# Patient Record
Sex: FEMALE | Race: WHITE | Employment: OTHER | URBAN - METROPOLITAN AREA
[De-identification: names, ages, dates, MRNs, and addresses within clinical notes are randomized per-mention and may not be internally consistent; named-entity substitution may affect disease eponyms.]

---

## 2022-08-16 DIAGNOSIS — N39.0 URINARY TRACT INFECTION, SITE NOT SPECIFIED: ICD-10-CM

## 2022-08-19 RX ORDER — NITROFURANTOIN MACROCRYSTALS 50 MG/1
CAPSULE ORAL
Qty: 30 CAPSULE | Refills: 0 | Status: SHIPPED | OUTPATIENT
Start: 2022-08-19

## 2022-11-01 ENCOUNTER — OFFICE VISIT (OUTPATIENT)
Dept: ENT CLINIC | Age: 82
End: 2022-11-01
Payer: MEDICARE

## 2022-11-01 VITALS
BODY MASS INDEX: 20.38 KG/M2 | WEIGHT: 115 LBS | SYSTOLIC BLOOD PRESSURE: 140 MMHG | HEIGHT: 63 IN | DIASTOLIC BLOOD PRESSURE: 78 MMHG

## 2022-11-01 DIAGNOSIS — R13.14 PHARYNGOESOPHAGEAL DYSPHAGIA: ICD-10-CM

## 2022-11-01 DIAGNOSIS — C01 MALIGNANT NEOPLASM OF BASE OF TONGUE (HCC): Primary | ICD-10-CM

## 2022-11-01 PROCEDURE — G8420 CALC BMI NORM PARAMETERS: HCPCS | Performed by: STUDENT IN AN ORGANIZED HEALTH CARE EDUCATION/TRAINING PROGRAM

## 2022-11-01 PROCEDURE — G8484 FLU IMMUNIZE NO ADMIN: HCPCS | Performed by: STUDENT IN AN ORGANIZED HEALTH CARE EDUCATION/TRAINING PROGRAM

## 2022-11-01 PROCEDURE — 1123F ACP DISCUSS/DSCN MKR DOCD: CPT | Performed by: STUDENT IN AN ORGANIZED HEALTH CARE EDUCATION/TRAINING PROGRAM

## 2022-11-01 PROCEDURE — 1090F PRES/ABSN URINE INCON ASSESS: CPT | Performed by: STUDENT IN AN ORGANIZED HEALTH CARE EDUCATION/TRAINING PROGRAM

## 2022-11-01 PROCEDURE — 1036F TOBACCO NON-USER: CPT | Performed by: STUDENT IN AN ORGANIZED HEALTH CARE EDUCATION/TRAINING PROGRAM

## 2022-11-01 PROCEDURE — 99213 OFFICE O/P EST LOW 20 MIN: CPT | Performed by: STUDENT IN AN ORGANIZED HEALTH CARE EDUCATION/TRAINING PROGRAM

## 2022-11-01 PROCEDURE — G8427 DOCREV CUR MEDS BY ELIG CLIN: HCPCS | Performed by: STUDENT IN AN ORGANIZED HEALTH CARE EDUCATION/TRAINING PROGRAM

## 2022-11-01 PROCEDURE — G8400 PT W/DXA NO RESULTS DOC: HCPCS | Performed by: STUDENT IN AN ORGANIZED HEALTH CARE EDUCATION/TRAINING PROGRAM

## 2022-11-01 RX ORDER — ATORVASTATIN CALCIUM 20 MG/1
TABLET, FILM COATED ORAL
COMMUNITY
Start: 2022-09-22

## 2022-11-01 RX ORDER — SODIUM FLUORIDE 6 MG/ML
PASTE, DENTIFRICE DENTAL
COMMUNITY
Start: 2022-10-14

## 2022-11-01 ASSESSMENT — ENCOUNTER SYMPTOMS
RESPIRATORY NEGATIVE: 1
ALLERGIC/IMMUNOLOGIC NEGATIVE: 1
GASTROINTESTINAL NEGATIVE: 1
EYES NEGATIVE: 1

## 2022-11-01 NOTE — PROGRESS NOTES
[facility] Office Note    Patient: Silverio Parsons  MRN: [id number]  : 1940  Gender:  female  Vital Signs: BP (!) 140/78 (Site: Left Upper Arm, Position: Sitting)   Ht 5' 3\" (1.6 m)   Wt 115 lb (52.2 kg)   BMI 20.37 kg/m²   Date: 2022    CC:   Chief Complaint   Patient presents with    Follow-up     H/o DL TDL TB esophagoscopy DL with biopy right BOT/Tonsil 13 path SCC right BOT and tonsil completed XRT 14, followed by Oscar, routine ear cleaning        HPI:  Silverio Parsons is a [de-identified] y.o. 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 She completed radiation in . She still follows with radiation oncology at [facility] and was scoped by them in . She endorses dysphagia. She previously saw speech therapy in [facility] who she likes. She has not seen GI. She wears hearing aids. Past Medical History, Past Surgical History, Family history, Social History, and Medications were all reviewed with the patient today and updated as necessary.      Allergies   Allergen Reactions    Latex Other (See Comments)     Patient states she is not allegic    Moxifloxacin Other (See Comments)     Pt states not allergic     Patient Active Problem List   Diagnosis    Urinary tract infection, site not specified    Hypothyroidism    Pure hypercholesterolemia    Calculus of kidney    Glaucoma     Current Outpatient Medications   Medication Sig    atorvastatin (LIPITOR) 20 MG tablet TAKE 1/2 (HALF) TABLET BY MOUTH DAILY OR AS DIRECTED    PREVIDENT 5000 BOOSTER PLUS 1.1 % PSTE USE WHEN BRUSHING TEETH AS NEEDED    nitrofurantoin (MACRODANTIN) 50 MG capsule TAKE 1 CAPSULE BY MOUTH EVERY DAY    atorvastatin (LIPITOR) 10 MG tablet Take by mouth daily    vitamin D 25 MCG (1000 UT) CAPS Take by mouth 2 times daily    ciprofloxacin (CIPRO) 500 MG tablet Take 500 mg by mouth 2 times daily    levothyroxine (SYNTHROID) 88 MCG tablet Take 88 mcg by mouth every morning (before breakfast)    SODIUM FLUORIDE, DENTAL GEL, 1.1 % GEL USE WHEN BRUSHING TEETH AS NEEDED     No current facility-administered medications for this visit. Past Medical History:   Diagnosis Date    Arthritis     Calculus of kidney     Calculus of kidney     Chronic pharyngitis     Dry mouth     Epistaxis     GERD (gastroesophageal reflux disease)     omeprazole    Glaucoma     Hoarseness     Hypothyroidism     Mixed conductive and sensorineural hearing loss     Pure hypercholesterolemia     Squamous cell cancer of tongue (HCC)     Thyroid disease     hypothyroid    Tongue lesion     Urinary tract infection, site not specified      Social History     Tobacco Use    Smoking status: Never    Smokeless tobacco: Never   Substance Use Topics    Alcohol use: No     Past Surgical History:   Procedure Laterality Date    CHOLECYSTECTOMY  2/2012    COLONOSCOPY      NEUROLOGICAL SURGERY      glossopharyngeal nerve surgery    OTHER SURGICAL HISTORY  1970's    Laprascopic \"band aid\" procedure    OTHER SURGICAL HISTORY  2003    Removal implants    OTHER SURGICAL HISTORY  1980's    Facial plastic surgery    LA ABDOMEN SURGERY PROC UNLISTED      feeding tube placed - later removed    RECTOCELE REPAIR  1970's    TRABECULECTOMY Left 04/2020    TUBAL LIGATION       Family History   Problem Relation Age of Onset    Cancer Father         PROSTATE GLAND        ROS:    Review of Systems   HENT: Negative. Eyes: Negative. Respiratory: Negative. Cardiovascular: Negative. Gastrointestinal: Negative. Endocrine: Negative. Genitourinary: Negative. Musculoskeletal: Negative. Skin: Negative. Allergic/Immunologic: Negative. Neurological: Negative. Hematological: Negative. Psychiatric/Behavioral: Negative. PHYSICAL EXAM:    BP (!) 140/78 (Site: Left Upper Arm, Position: Sitting)   Ht 5' 3\" (1.6 m)   Wt 115 lb (52.2 kg)   BMI 20.37 kg/m²     General: NAD, well-appearing  Neuro: No gross neuro deficits. CN's II-XII intact. No facial weakness. Eyes: EOMI. Pupils reactive. No periorbital edema/ecchymosis. Skin: No facial erythema, rashes or concerning lesions. Nose: No external deviations or saddling. Intranasally, septum is midline without perforations, nasal mucosa appears healthy with no erythema, mucopurulence, or polyps. Mouth: Moist mucus membranes, normal tongue/palate mobility, no concerning mucosal lesions. Oropharynx: clear with no erythema/exudate, no tonsillar hypertrophy. Smooth post radiation changes to base of tongue, right  Ears: Normal appearing auricles, no hematomas. EACs clear with no cerumen impaction, healthy canal skin, TM's intact with no perforations or retraction pockets. No middle ear effusions. Neck: Soft, supple, no palpable lateral neck masses. No parotid or submandibular masses. No thyromegaly or palpable thyroid nodules. No surgical scars. Lymphatics: No palpable cervical LAD. Resp/Lungs: No audible stridor or wheezing, CTAB  Heart: RRR  Extremities: No clubbing or cyanosis. ASSESSMENT and PLAN  15-year-old female with a history of right base of tongue squamous cell carcinoma treated with radiation in 2014. No evidence of recurrent disease on exam today. She still follows with radiation oncology at [facility]. For her dysphagia she is going to check about getting back with her speech therapist and easily and if not I can refer her to [facility] speech therapy or possibly GI for EGD with dilation. I will see her again in 1 year. ICD-10-CM    1. Malignant neoplasm of base of tongue ([address]  C01       2. Pharyngoesophageal dysphagia  R13.14             Roman Jack MD  11/1/2022  Electronically signed    Note dictated using voice recognition software. Please excuse any typos.

## 2023-12-14 ENCOUNTER — OFFICE VISIT (OUTPATIENT)
Dept: ENT CLINIC | Age: 83
End: 2023-12-14
Payer: MEDICARE

## 2023-12-14 VITALS
SYSTOLIC BLOOD PRESSURE: 140 MMHG | HEIGHT: 63 IN | BODY MASS INDEX: 20.41 KG/M2 | DIASTOLIC BLOOD PRESSURE: 78 MMHG | WEIGHT: 115.2 LBS

## 2023-12-14 DIAGNOSIS — R13.14 PHARYNGOESOPHAGEAL DYSPHAGIA: ICD-10-CM

## 2023-12-14 DIAGNOSIS — J38.00 VOCAL CORD PARESIS: ICD-10-CM

## 2023-12-14 DIAGNOSIS — C01 MALIGNANT NEOPLASM OF BASE OF TONGUE (HCC): Primary | ICD-10-CM

## 2023-12-14 DIAGNOSIS — K21.9 LARYNGOPHARYNGEAL REFLUX (LPR): ICD-10-CM

## 2023-12-14 PROCEDURE — 1036F TOBACCO NON-USER: CPT | Performed by: STUDENT IN AN ORGANIZED HEALTH CARE EDUCATION/TRAINING PROGRAM

## 2023-12-14 PROCEDURE — 1090F PRES/ABSN URINE INCON ASSESS: CPT | Performed by: STUDENT IN AN ORGANIZED HEALTH CARE EDUCATION/TRAINING PROGRAM

## 2023-12-14 PROCEDURE — 31575 DIAGNOSTIC LARYNGOSCOPY: CPT | Performed by: STUDENT IN AN ORGANIZED HEALTH CARE EDUCATION/TRAINING PROGRAM

## 2023-12-14 PROCEDURE — G8420 CALC BMI NORM PARAMETERS: HCPCS | Performed by: STUDENT IN AN ORGANIZED HEALTH CARE EDUCATION/TRAINING PROGRAM

## 2023-12-14 PROCEDURE — G8400 PT W/DXA NO RESULTS DOC: HCPCS | Performed by: STUDENT IN AN ORGANIZED HEALTH CARE EDUCATION/TRAINING PROGRAM

## 2023-12-14 PROCEDURE — G8484 FLU IMMUNIZE NO ADMIN: HCPCS | Performed by: STUDENT IN AN ORGANIZED HEALTH CARE EDUCATION/TRAINING PROGRAM

## 2023-12-14 PROCEDURE — 1123F ACP DISCUSS/DSCN MKR DOCD: CPT | Performed by: STUDENT IN AN ORGANIZED HEALTH CARE EDUCATION/TRAINING PROGRAM

## 2023-12-14 PROCEDURE — 99213 OFFICE O/P EST LOW 20 MIN: CPT | Performed by: STUDENT IN AN ORGANIZED HEALTH CARE EDUCATION/TRAINING PROGRAM

## 2023-12-14 PROCEDURE — G8427 DOCREV CUR MEDS BY ELIG CLIN: HCPCS | Performed by: STUDENT IN AN ORGANIZED HEALTH CARE EDUCATION/TRAINING PROGRAM

## 2023-12-14 RX ORDER — FAMOTIDINE 40 MG/1
40 TABLET, FILM COATED ORAL EVERY EVENING
Qty: 90 TABLET | Refills: 5 | Status: SHIPPED | OUTPATIENT
Start: 2023-12-14

## 2024-01-16 ENCOUNTER — PREP FOR PROCEDURE (OUTPATIENT)
Dept: ENT CLINIC | Age: 84
End: 2024-01-16

## 2024-01-16 ENCOUNTER — OFFICE VISIT (OUTPATIENT)
Dept: ENT CLINIC | Age: 84
End: 2024-01-16
Payer: MEDICARE

## 2024-01-16 VITALS
DIASTOLIC BLOOD PRESSURE: 70 MMHG | SYSTOLIC BLOOD PRESSURE: 148 MMHG | HEIGHT: 63 IN | BODY MASS INDEX: 20.38 KG/M2 | WEIGHT: 115 LBS

## 2024-01-16 DIAGNOSIS — J38.1: ICD-10-CM

## 2024-01-16 DIAGNOSIS — R13.14 PHARYNGOESOPHAGEAL DYSPHAGIA: Primary | ICD-10-CM

## 2024-01-16 DIAGNOSIS — R49.0 DYSPHONIA: ICD-10-CM

## 2024-01-16 DIAGNOSIS — J38.00 VOCAL CORD PARESIS: ICD-10-CM

## 2024-01-16 DIAGNOSIS — C01 MALIGNANT NEOPLASM OF BASE OF TONGUE (HCC): ICD-10-CM

## 2024-01-16 DIAGNOSIS — K21.9 LARYNGOPHARYNGEAL REFLUX (LPR): ICD-10-CM

## 2024-01-16 DIAGNOSIS — R49.0 HOARSENESS: ICD-10-CM

## 2024-01-16 PROBLEM — R13.10 DYSPHAGIA: Status: ACTIVE | Noted: 2024-01-16

## 2024-01-16 PROCEDURE — 1036F TOBACCO NON-USER: CPT | Performed by: STUDENT IN AN ORGANIZED HEALTH CARE EDUCATION/TRAINING PROGRAM

## 2024-01-16 PROCEDURE — 1123F ACP DISCUSS/DSCN MKR DOCD: CPT | Performed by: STUDENT IN AN ORGANIZED HEALTH CARE EDUCATION/TRAINING PROGRAM

## 2024-01-16 PROCEDURE — G8427 DOCREV CUR MEDS BY ELIG CLIN: HCPCS | Performed by: STUDENT IN AN ORGANIZED HEALTH CARE EDUCATION/TRAINING PROGRAM

## 2024-01-16 PROCEDURE — G8400 PT W/DXA NO RESULTS DOC: HCPCS | Performed by: STUDENT IN AN ORGANIZED HEALTH CARE EDUCATION/TRAINING PROGRAM

## 2024-01-16 PROCEDURE — G8420 CALC BMI NORM PARAMETERS: HCPCS | Performed by: STUDENT IN AN ORGANIZED HEALTH CARE EDUCATION/TRAINING PROGRAM

## 2024-01-16 PROCEDURE — G8484 FLU IMMUNIZE NO ADMIN: HCPCS | Performed by: STUDENT IN AN ORGANIZED HEALTH CARE EDUCATION/TRAINING PROGRAM

## 2024-01-16 PROCEDURE — 99213 OFFICE O/P EST LOW 20 MIN: CPT | Performed by: STUDENT IN AN ORGANIZED HEALTH CARE EDUCATION/TRAINING PROGRAM

## 2024-01-16 PROCEDURE — 1090F PRES/ABSN URINE INCON ASSESS: CPT | Performed by: STUDENT IN AN ORGANIZED HEALTH CARE EDUCATION/TRAINING PROGRAM

## 2024-01-16 ASSESSMENT — ENCOUNTER SYMPTOMS
EYE ITCHING: 0
TROUBLE SWALLOWING: 1
SHORTNESS OF BREATH: 0
VOICE CHANGE: 1
EYE REDNESS: 0
VOMITING: 0

## 2024-01-16 NOTE — PROGRESS NOTES
LAD.  Resp/Lungs: No audible stridor or wheezing, CTAB  Heart: RRR  Extremities: No clubbing or cyanosis.      ASSESSMENT and PLAN  We will proceed with telescopic direct laryngoscopy with prolaryn plus injection to the paretic right true vocal cord as well as EGD with esophageal dilation using Raygoza dilators.  Risk include bleeding, hoarseness, shortness of breath, infection, and injury to the teeth lips tongue or gums.  She understands and agrees to proceed.    ICD-10-CM    1. Pharyngoesophageal dysphagia  R13.14       2. 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 Laryngopharyngeal reflux (LPR)  K21.9       4. Malignant neoplasm of base of tongue (HCC)  C01               Stewart Caldera MD  1/16/2024  Electronically signed    Note dictated using voice recognition software.  Please excuse any typos.

## 2024-02-20 PROBLEM — R13.10 SWALLOWING DIFFICULTY: Status: ACTIVE | Noted: 2024-01-16

## 2024-02-20 PROBLEM — R49.0 DYSPHONIA: Status: ACTIVE | Noted: 2024-01-16

## 2024-02-20 PROBLEM — J38.1: Status: ACTIVE | Noted: 2024-01-16

## 2024-03-27 ENCOUNTER — PREP FOR PROCEDURE (OUTPATIENT)
Dept: ENT CLINIC | Age: 84
End: 2024-03-27

## 2024-03-27 DIAGNOSIS — R49.0 DYSPHONIA: ICD-10-CM

## 2024-03-27 DIAGNOSIS — J38.00 VOCAL CORD PARESIS: Primary | ICD-10-CM

## 2024-03-27 DIAGNOSIS — R49.0 HOARSENESS: ICD-10-CM

## 2024-03-29 RX ORDER — LISINOPRIL 5 MG/1
5 TABLET ORAL NIGHTLY
COMMUNITY

## 2024-03-29 NOTE — PERIOP NOTE
Patient verified name and .  Order for consent was found in EHR and does not match case posting ;K[place]ah in scheduling notified. Case posting states Laryngoscopy    31083 Suspension Microlaryngoscopy with prolayrn plus Injection, 78809 EGD, and 43005 Esophageal Dilation     Type 1b surgery, PAT phone  assessment complete.  Orders were received.  Labs per surgeon: None  Labs per anesthesia protocol: None    Patient answered medical/surgical history questions at their best of ability. All prior to admission medications documented in EPIC.    Patient instructed to continue taking all prescription medications up to the day of surgery but to take only the following medications the day of surgery according to anesthesia guidelines with a small sip of water: Levothyroxine Also, patient is requested to take 2 Tylenol in the morning and then again before bed on the day before surgery. Regular or extra strength may be used.       Patient informed that all vitamins and supplements should be held 7 days prior to surgery and NSAIDS 5 days prior to surgery. Prescription meds to hold:None    Patient instructed on the following:    > Arrive at A Entrance, time of arrival to be called the day before by 1700  > NPO after midnight, unless otherwise indicated, including gum, mints, and ice chips  > Responsible adult must drive patient to the hospital, stay during surgery, and patient will need supervision 24 hours after anesthesia  > Use non moisturizing soap in shower the night before surgery and on the morning of surgery  > All piercings must be removed prior to arrival.    > Leave all valuables (money and jewelry) at home but bring insurance card and ID on DOS.   > You may be required to pay a deductible or co-pay on the day of your procedure. You can pre-pay by calling [phone] if your surgery is at the [facility] or [phone] if your surgery is at the [facility].  > Do not wear make-up, nail polish, lotions, cologne,

## 2024-04-04 ENCOUNTER — ANESTHESIA EVENT (OUTPATIENT)
Dept: SURGERY | Age: 84
End: 2024-04-04
Payer: MEDICARE

## 2024-04-04 NOTE — DISCHARGE INSTRUCTIONS
Try to limit voice use for the next 24 hours.  If you do need to speak to your regular speaking voice do not shout or whisper is just put strain on your vocal cords.    After general anesthesia or intravenous sedation, for 24 hours or while taking prescription Narcotics:  Limit your activities  A responsible adult needs to be with you for the next 24 hours  Do not drive and operate hazardous machinery  Do not make important personal or business decisions  Do not drink alcoholic beverages  If you have not urinated within 8 hours after discharge, and you are experiencing discomfort from urinary retention, please go to the nearest ED.  If you have sleep apnea and have a CPAP machine, please use it for all naps and sleeping.  Please use caution when taking narcotics and any of your home medications that may cause drowsiness.  *  Please give a list of your current medications to your Primary Care Provider.  *  Please update this list whenever your medications are discontinued, doses are      changed, or new medications (including over-the-counter products) are added.  *  Please carry medication information at all times in case of emergency situations.    These are general instructions for a healthy lifestyle:  No smoking/ No tobacco products/ Avoid exposure to second hand smoke  Surgeon General's Warning:  Quitting smoking now greatly reduces serious risk to your health.  Obesity, smoking, and sedentary lifestyle greatly increases your risk for illness  A healthy diet, regular physical exercise & weight monitoring are important for maintaining a healthy lifestyle    You may be retaining fluid if you have a history of heart failure or if you experience any of the following symptoms:  Weight gain of 3 pounds or more overnight or 5 pounds in a week, increased swelling in our hands or feet or shortness of breath while lying flat in bed.  Please call your doctor as soon as you notice any of these symptoms; do not wait until

## 2024-04-05 ENCOUNTER — HOSPITAL ENCOUNTER (OUTPATIENT)
Age: 84
Setting detail: OUTPATIENT SURGERY
Discharge: HOME | End: 2024-04-05
Attending: STUDENT IN AN ORGANIZED HEALTH CARE EDUCATION/TRAINING PROGRAM | Admitting: STUDENT IN AN ORGANIZED HEALTH CARE EDUCATION/TRAINING PROGRAM
Payer: MEDICARE

## 2024-04-05 ENCOUNTER — ANESTHESIA (OUTPATIENT)
Dept: SURGERY | Age: 84
End: 2024-04-05
Payer: MEDICARE

## 2024-04-05 VITALS
HEIGHT: 63 IN | TEMPERATURE: 98.7 F | RESPIRATION RATE: 19 BRPM | OXYGEN SATURATION: 91 % | DIASTOLIC BLOOD PRESSURE: 54 MMHG | HEART RATE: 71 BPM | WEIGHT: 112.8 LBS | SYSTOLIC BLOOD PRESSURE: 122 MMHG | BODY MASS INDEX: 19.99 KG/M2

## 2024-04-05 DIAGNOSIS — R49.0 DYSPHONIA: ICD-10-CM

## 2024-04-05 DIAGNOSIS — R49.0 HOARSENESS: ICD-10-CM

## 2024-04-05 DIAGNOSIS — J38.00 VOCAL CORD PARESIS: ICD-10-CM

## 2024-04-05 PROCEDURE — 3600000002 HC SURGERY LEVEL 2 BASE: Performed by: STUDENT IN AN ORGANIZED HEALTH CARE EDUCATION/TRAINING PROGRAM

## 2024-04-05 PROCEDURE — 7100000010 HC PHASE II RECOVERY - FIRST 15 MIN: Performed by: STUDENT IN AN ORGANIZED HEALTH CARE EDUCATION/TRAINING PROGRAM

## 2024-04-05 PROCEDURE — 7100000011 HC PHASE II RECOVERY - ADDTL 15 MIN: Performed by: STUDENT IN AN ORGANIZED HEALTH CARE EDUCATION/TRAINING PROGRAM

## 2024-04-05 PROCEDURE — 2500000003 HC RX 250 WO HCPCS: Performed by: NURSE ANESTHETIST, CERTIFIED REGISTERED

## 2024-04-05 PROCEDURE — 43450 DILATE ESOPHAGUS 1/MULT PASS: CPT | Performed by: STUDENT IN AN ORGANIZED HEALTH CARE EDUCATION/TRAINING PROGRAM

## 2024-04-05 PROCEDURE — 2500000003 HC RX 250 WO HCPCS: Performed by: ANESTHESIOLOGY

## 2024-04-05 PROCEDURE — L8607 INJ VOCAL CORD BULKING AGENT: HCPCS | Performed by: STUDENT IN AN ORGANIZED HEALTH CARE EDUCATION/TRAINING PROGRAM

## 2024-04-05 PROCEDURE — 3700000001 HC ADD 15 MINUTES (ANESTHESIA): Performed by: STUDENT IN AN ORGANIZED HEALTH CARE EDUCATION/TRAINING PROGRAM

## 2024-04-05 PROCEDURE — 3600000012 HC SURGERY LEVEL 2 ADDTL 15MIN: Performed by: STUDENT IN AN ORGANIZED HEALTH CARE EDUCATION/TRAINING PROGRAM

## 2024-04-05 PROCEDURE — 43235 EGD DIAGNOSTIC BRUSH WASH: CPT | Performed by: STUDENT IN AN ORGANIZED HEALTH CARE EDUCATION/TRAINING PROGRAM

## 2024-04-05 PROCEDURE — 6360000002 HC RX W HCPCS: Performed by: NURSE ANESTHETIST, CERTIFIED REGISTERED

## 2024-04-05 PROCEDURE — 31571 LARYNGOSCOP W/VC INJ + SCOPE: CPT | Performed by: STUDENT IN AN ORGANIZED HEALTH CARE EDUCATION/TRAINING PROGRAM

## 2024-04-05 PROCEDURE — 6370000000 HC RX 637 (ALT 250 FOR IP): Performed by: ANESTHESIOLOGY

## 2024-04-05 PROCEDURE — 3700000000 HC ANESTHESIA ATTENDED CARE: Performed by: STUDENT IN AN ORGANIZED HEALTH CARE EDUCATION/TRAINING PROGRAM

## 2024-04-05 PROCEDURE — 7100000000 HC PACU RECOVERY - FIRST 15 MIN: Performed by: STUDENT IN AN ORGANIZED HEALTH CARE EDUCATION/TRAINING PROGRAM

## 2024-04-05 PROCEDURE — 2580000003 HC RX 258: Performed by: ANESTHESIOLOGY

## 2024-04-05 PROCEDURE — 2709999900 HC NON-CHARGEABLE SUPPLY: Performed by: STUDENT IN AN ORGANIZED HEALTH CARE EDUCATION/TRAINING PROGRAM

## 2024-04-05 PROCEDURE — 7100000001 HC PACU RECOVERY - ADDTL 15 MIN: Performed by: STUDENT IN AN ORGANIZED HEALTH CARE EDUCATION/TRAINING PROGRAM

## 2024-04-05 DEVICE — PROLARYN PLUS IS A WATER BASED INJECTABLE GEL IMPLANT USED TO TREAT VOCAL FOLD INSUFFICIENCY. THE PRINCIPLE COMPONENT OF PROLARYN PLUS IS SYNTHETIC CALCIUM HYDROXYAPATITE, A BIOMATERIAL FOUND IN BONE AND TEETH. INJECTION WITH PROLARYN PLUS AUGMENTS OR BULKS UPS THE DISPLACED OR DAMAGED VOCAL FOLD SO THAT IT CAN IMPROVE SPEAKING. THE RESULT IS LONG TERM RESTORATION AND AUGMENTATION. PROLARYN PLUS CAN BE INJECTED WITH A 26 GAUGE OR LARGER DIAMETER THIN-WALL-NEEDLE.
Type: IMPLANTABLE DEVICE | Site: THROAT | Status: FUNCTIONAL
Brand: PROLARYN PLUS INJECTABLE IMPLANT

## 2024-04-05 RX ORDER — ONDANSETRON 2 MG/ML
4 INJECTION INTRAMUSCULAR; INTRAVENOUS
Status: DISCONTINUED | OUTPATIENT
Start: 2024-04-05 | End: 2024-04-05 | Stop reason: HOSPADM

## 2024-04-05 RX ORDER — NALOXONE HYDROCHLORIDE 0.4 MG/ML
INJECTION, SOLUTION INTRAMUSCULAR; INTRAVENOUS; SUBCUTANEOUS PRN
Status: DISCONTINUED | OUTPATIENT
Start: 2024-04-05 | End: 2024-04-05 | Stop reason: HOSPADM

## 2024-04-05 RX ORDER — SODIUM CHLORIDE 0.9 % (FLUSH) 0.9 %
5-40 SYRINGE (ML) INJECTION EVERY 12 HOURS SCHEDULED
Status: DISCONTINUED | OUTPATIENT
Start: 2024-04-05 | End: 2024-04-05 | Stop reason: HOSPADM

## 2024-04-05 RX ORDER — IPRATROPIUM BROMIDE AND ALBUTEROL SULFATE 2.5; .5 MG/3ML; MG/3ML
1 SOLUTION RESPIRATORY (INHALATION)
Status: DISCONTINUED | OUTPATIENT
Start: 2024-04-05 | End: 2024-04-05 | Stop reason: HOSPADM

## 2024-04-05 RX ORDER — LIDOCAINE HYDROCHLORIDE 10 MG/ML
1 INJECTION, SOLUTION INFILTRATION; PERINEURAL
Status: DISCONTINUED | OUTPATIENT
Start: 2024-04-05 | End: 2024-04-05 | Stop reason: HOSPADM

## 2024-04-05 RX ORDER — DEXAMETHASONE SODIUM PHOSPHATE 10 MG/ML
INJECTION INTRAMUSCULAR; INTRAVENOUS PRN
Status: DISCONTINUED | OUTPATIENT
Start: 2024-04-05 | End: 2024-04-05 | Stop reason: SDUPTHER

## 2024-04-05 RX ORDER — SODIUM CHLORIDE 0.9 % (FLUSH) 0.9 %
5-40 SYRINGE (ML) INJECTION PRN
Status: DISCONTINUED | OUTPATIENT
Start: 2024-04-05 | End: 2024-04-05 | Stop reason: HOSPADM

## 2024-04-05 RX ORDER — OXYCODONE HYDROCHLORIDE 5 MG/1
5 TABLET ORAL
Status: DISCONTINUED | OUTPATIENT
Start: 2024-04-05 | End: 2024-04-05 | Stop reason: HOSPADM

## 2024-04-05 RX ORDER — MIDAZOLAM HYDROCHLORIDE 2 MG/2ML
2 INJECTION, SOLUTION INTRAMUSCULAR; INTRAVENOUS
Status: DISCONTINUED | OUTPATIENT
Start: 2024-04-05 | End: 2024-04-05 | Stop reason: HOSPADM

## 2024-04-05 RX ORDER — PROPOFOL 10 MG/ML
INJECTION, EMULSION INTRAVENOUS PRN
Status: DISCONTINUED | OUTPATIENT
Start: 2024-04-05 | End: 2024-04-05 | Stop reason: SDUPTHER

## 2024-04-05 RX ORDER — HALOPERIDOL 5 MG/ML
1 INJECTION INTRAMUSCULAR
Status: DISCONTINUED | OUTPATIENT
Start: 2024-04-05 | End: 2024-04-05 | Stop reason: HOSPADM

## 2024-04-05 RX ORDER — FENTANYL CITRATE 50 UG/ML
INJECTION, SOLUTION INTRAMUSCULAR; INTRAVENOUS PRN
Status: DISCONTINUED | OUTPATIENT
Start: 2024-04-05 | End: 2024-04-05 | Stop reason: SDUPTHER

## 2024-04-05 RX ORDER — ROCURONIUM BROMIDE 10 MG/ML
INJECTION, SOLUTION INTRAVENOUS PRN
Status: DISCONTINUED | OUTPATIENT
Start: 2024-04-05 | End: 2024-04-05 | Stop reason: SDUPTHER

## 2024-04-05 RX ORDER — FENTANYL CITRATE 50 UG/ML
50 INJECTION, SOLUTION INTRAMUSCULAR; INTRAVENOUS EVERY 5 MIN PRN
Status: DISCONTINUED | OUTPATIENT
Start: 2024-04-05 | End: 2024-04-05 | Stop reason: HOSPADM

## 2024-04-05 RX ORDER — LIDOCAINE HYDROCHLORIDE 20 MG/ML
INJECTION, SOLUTION EPIDURAL; INFILTRATION; INTRACAUDAL; PERINEURAL PRN
Status: DISCONTINUED | OUTPATIENT
Start: 2024-04-05 | End: 2024-04-05 | Stop reason: SDUPTHER

## 2024-04-05 RX ORDER — SUCCINYLCHOLINE/SOD CL,ISO/PF 200MG/10ML
SYRINGE (ML) INTRAVENOUS PRN
Status: DISCONTINUED | OUTPATIENT
Start: 2024-04-05 | End: 2024-04-05 | Stop reason: SDUPTHER

## 2024-04-05 RX ORDER — ACETAMINOPHEN 500 MG
1000 TABLET ORAL ONCE
Status: DISCONTINUED | OUTPATIENT
Start: 2024-04-05 | End: 2024-04-05 | Stop reason: HOSPADM

## 2024-04-05 RX ORDER — ONDANSETRON 2 MG/ML
INJECTION INTRAMUSCULAR; INTRAVENOUS PRN
Status: DISCONTINUED | OUTPATIENT
Start: 2024-04-05 | End: 2024-04-05 | Stop reason: SDUPTHER

## 2024-04-05 RX ORDER — SODIUM CHLORIDE, SODIUM LACTATE, POTASSIUM CHLORIDE, CALCIUM CHLORIDE 600; 310; 30; 20 MG/100ML; MG/100ML; MG/100ML; MG/100ML
INJECTION, SOLUTION INTRAVENOUS CONTINUOUS
Status: DISCONTINUED | OUTPATIENT
Start: 2024-04-05 | End: 2024-04-05 | Stop reason: HOSPADM

## 2024-04-05 RX ORDER — ACETAMINOPHEN 160 MG/5ML
1000 SUSPENSION ORAL ONCE
Status: COMPLETED | OUTPATIENT
Start: 2024-04-05 | End: 2024-04-05

## 2024-04-05 RX ORDER — HYDROMORPHONE HYDROCHLORIDE 1 MG/ML
0.5 INJECTION, SOLUTION INTRAMUSCULAR; INTRAVENOUS; SUBCUTANEOUS EVERY 10 MIN PRN
Status: DISCONTINUED | OUTPATIENT
Start: 2024-04-05 | End: 2024-04-05 | Stop reason: HOSPADM

## 2024-04-05 RX ADMIN — LIDOCAINE HYDROCHLORIDE 100 MG: 20 INJECTION, SOLUTION EPIDURAL; INFILTRATION; INTRACAUDAL; PERINEURAL at 10:04

## 2024-04-05 RX ADMIN — FENTANYL CITRATE 25 MCG: 50 INJECTION, SOLUTION INTRAMUSCULAR; INTRAVENOUS at 10:56

## 2024-04-05 RX ADMIN — ONDANSETRON 4 MG: 2 INJECTION INTRAMUSCULAR; INTRAVENOUS at 10:22

## 2024-04-05 RX ADMIN — FENTANYL CITRATE 25 MCG: 50 INJECTION, SOLUTION INTRAMUSCULAR; INTRAVENOUS at 10:07

## 2024-04-05 RX ADMIN — FENTANYL CITRATE 25 MCG: 50 INJECTION, SOLUTION INTRAMUSCULAR; INTRAVENOUS at 10:13

## 2024-04-05 RX ADMIN — DEXAMETHASONE SODIUM PHOSPHATE 10 MG: 10 INJECTION INTRAMUSCULAR; INTRAVENOUS at 10:19

## 2024-04-05 RX ADMIN — ROCURONIUM BROMIDE 5 MG: 10 INJECTION, SOLUTION INTRAVENOUS at 10:04

## 2024-04-05 RX ADMIN — PROPOFOL 120 MG: 10 INJECTION, EMULSION INTRAVENOUS at 10:04

## 2024-04-05 RX ADMIN — SODIUM CHLORIDE, POTASSIUM CHLORIDE, SODIUM LACTATE AND CALCIUM CHLORIDE: 600; 310; 30; 20 INJECTION, SOLUTION INTRAVENOUS at 09:21

## 2024-04-05 RX ADMIN — HYDROMORPHONE HYDROCHLORIDE 0.5 MG: 1 INJECTION, SOLUTION INTRAMUSCULAR; INTRAVENOUS; SUBCUTANEOUS at 11:28

## 2024-04-05 RX ADMIN — HYDROMORPHONE HYDROCHLORIDE 0.5 MG: 1 INJECTION, SOLUTION INTRAMUSCULAR; INTRAVENOUS; SUBCUTANEOUS at 11:40

## 2024-04-05 RX ADMIN — ACETAMINOPHEN 1000 MG: 325 SUSPENSION ORAL at 12:31

## 2024-04-05 RX ADMIN — Medication 80 MG: at 10:04

## 2024-04-05 RX ADMIN — FENTANYL CITRATE 25 MCG: 50 INJECTION, SOLUTION INTRAMUSCULAR; INTRAVENOUS at 10:32

## 2024-04-05 ASSESSMENT — PAIN DESCRIPTION - LOCATION
LOCATION: THROAT

## 2024-04-05 ASSESSMENT — PAIN - FUNCTIONAL ASSESSMENT: PAIN_FUNCTIONAL_ASSESSMENT: 0-10

## 2024-04-05 ASSESSMENT — PAIN SCALES - GENERAL
PAINLEVEL_OUTOF10: 5
PAINLEVEL_OUTOF10: 3
PAINLEVEL_OUTOF10: 6

## 2024-04-05 ASSESSMENT — PAIN DESCRIPTION - ORIENTATION
ORIENTATION: ANTERIOR

## 2024-04-05 ASSESSMENT — PAIN DESCRIPTION - DESCRIPTORS
DESCRIPTORS: SORE

## 2024-04-05 ASSESSMENT — PAIN DESCRIPTION - FREQUENCY
FREQUENCY: CONTINUOUS

## 2024-04-05 ASSESSMENT — PAIN DESCRIPTION - PAIN TYPE
TYPE: SURGICAL PAIN
TYPE: SURGICAL PAIN

## 2024-04-05 NOTE — H&P
[facility] Office Note     Patient: Gabriela Knight  MRN: [id number]  : 1940  Gender:  female  Vital Signs: BP (!) 148/70 (Site: Left Upper Arm, Position: Sitting)   Ht 1.6 m (5' 3\")   Wt 52.2 kg (115 lb)   BMI 20.37 kg/m²   Date: 2024     CC:        Chief Complaint   Patient presents with    Other       Patient presents today to discuss future care plan.          HPI:  Gabriela Knight is a 83 y.o. 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  She completed radiation in .  She still follows with radiation oncology at [facility].   She wears hearing aids.  She is having excessive salivation after meals.  She has some dysphagia.  She has to drink copious fluids while eating meals.  She has trouble swallowing pills.  She will have occasional aspiration with liquids.     Past Medical History, Past Surgical History, Family history, Social History, and Medications were all reviewed with the patient today and updated as necessary.      No Known Allergies      Patient Active Problem List   Diagnosis    Urinary tract infection, site not specified    Hypothyroidism    Pure hypercholesterolemia    Calculus of kidney    Glaucoma           Current Outpatient Medications   Medication Sig    famotidine (PEPCID) 40 MG tablet Take 1 tablet by mouth every evening    atorvastatin (LIPITOR) 20 MG tablet TAKE 1/2 (HALF) TABLET BY MOUTH DAILY OR AS DIRECTED    PREVIDENT 5000 BOOSTER PLUS 1.1 % PSTE USE WHEN BRUSHING TEETH AS NEEDED    nitrofurantoin (MACRODANTIN) 50 MG capsule TAKE 1 CAPSULE BY MOUTH EVERY DAY    vitamin D 25 MCG (1000 UT) CAPS Take by mouth 2 times daily    levothyroxine (SYNTHROID) 88 MCG tablet Take 1 tablet by mouth every morning (before breakfast)      No current facility-administered medications for this visit.      Past Medical History        Past Medical History:   Diagnosis Date    Arthritis      Calculus of kidney      Calculus of kidney      Chronic  Sitting)   Ht 1.6 m (5' 3\")   Wt 52.2 kg (115 lb)   BMI 20.37 kg/m²      General: NAD, well-appearing  Neuro: No gross neuro deficits. CN's II-XII intact. No facial weakness.  Eyes: EOMI. Pupils reactive. No periorbital edema/ecchymosis.   Skin: No facial erythema, rashes or concerning lesions.  Nose: No external deviations or saddling. Intranasally, septum is midline without perforations, nasal mucosa appears healthy with no erythema, mucopurulence, or polyps.  Mouth: Moist mucus membranes, normal tongue/palate mobility, no concerning mucosal lesions.   Oropharynx: clear with no erythema/exudate, no tonsillar hypertrophy.  Ears: Normal appearing auricles, no hematomas. EACs clear with no cerumen impaction, healthy canal skin, TM's intact with no perforations or retraction pockets. No middle ear effusions.  Neck: Soft, supple, no palpable lateral neck masses. No parotid or submandibular masses. No thyromegaly or palpable thyroid nodules. No surgical scars.  Lymphatics: No palpable cervical LAD.  Resp/Lungs: No audible stridor or wheezing, CTAB  Heart: RRR  Extremities: No clubbing or cyanosis.     General: NAD, well-appearing  Neuro: No gross neuro deficits. CN's II-XII intact. No facial weakness.  Eyes: EOMI. Pupils reactive. No periorbital edema/ecchymosis.   Skin: No facial erythema, rashes or concerning lesions.  Nose: No external deviations or saddling. Intranasally, septum is midline without perforations, nasal mucosa appears healthy with no erythema, mucopurulence, or polyps.  Mouth: Dry mucus membranes, normal tongue/palate mobility, no concerning mucosal lesions.   Oropharynx: clear with no erythema/exudate, no tonsillar hypertrophy.  Smooth post radiation changes to base of tongue, right  Ears: Normal appearing auricles, no hematomas. EACs clear with no cerumen impaction, healthy canal skin, TM's intact with no perforations or retraction pockets. No middle ear effusions.  Neck: Soft, supple, no palpable

## 2024-04-05 NOTE — OP NOTE
Operative Note      Patient: Gabriela Knight  YOB: 1940  MRN: [id number]    Date of Procedure: 4/5/2024    Pre-Op Diagnosis Codes:     * Larynx polyp [J38.1]     * Swallowing difficulty [R13.10]     * Dysphonia [R49.0]    Post-Op Diagnosis: Same       Procedure(s):  SUSPENSION MICROLARYNGOSCOPY WITH INJECTION  ESOPHAGOGASTRODUODENOSCOPY  ESOPHAGEAL DILATATION    Surgeon(s):  Stewart Caldera MD    Assistant:   * No surgical staff found *    Anesthesia: General    Estimated Blood Loss (mL): less than 50     Complications: None    Specimens:   * No specimens in log *    Implants:  Implant Name Type Inv. Item Serial No.  Lot No. LRB No. Used Action   IMPLANT LARYN 1ML GEL INJ VOCAL CRD MEDIALIZATION PROLARYN - [id number]  IMPLANT LARYN 1ML GEL INJ VOCAL CRD MEDIALIZATION PROLARYN  [facility] [id number] N/A 1 Implanted         Drains: * No LDAs found *    Findings: Mild trismus.  Mild esophageal stricture at 20 cm was another small stricture at 40 cm.  Patient dilated to #60 Raygoza.  Good vocal cord pumping with 0.6 cc of prolaryn plus injected into the right true vocal cord and 0.4 cc injected into the left.  Grade 2 view with Dedo.    Detailed Description of Procedure:   The patient was identified in preoperative holding area.  Informed consent was obtained.  The patient was taken back to the operating suite laid supine on the operating room table.  Upper and lower extremity pressure points were padded.  Anesthesia was induced and the patient was intubated without complication.  A preoperative timeout was performed.  The patient was prepped and draped in the usual sterile fashion.  A head wrap was placed.  A tooth guard was inserted.  There was a grade 2 view with the Dedo laryngoscope.  The Dedo was placed in suspension.  The 0 degree Schroeder jimy telescope was used to examine the larynx.  Next 0.6 cc of the prolaryn gel was injected posterior lateral

## 2024-04-05 NOTE — ANESTHESIA POSTPROCEDURE EVALUATION
Department of Anesthesiology  Postprocedure Note    Patient: Gabriela Knight  MRN: [id number]  YOB: 1940  Date of evaluation: 4/5/2024    Procedure Summary       Date: 04/05/24 Room / Location: [facility] MAIN OR 02 / [facility] MAIN OR    Anesthesia Start: 1001 Anesthesia Stop: 1114    Procedures:       SUSPENSION MICROLARYNGOSCOPY WITH INJECTION (Throat)      ESOPHAGOGASTRODUODENOSCOPY (Throat)      ESOPHAGEAL DILATATION (Mouth) Diagnosis:       Larynx polyp      Swallowing difficulty      Dysphonia      (Larynx polyp [J38.1])      (Swallowing difficulty [R13.10])      (Dysphonia [R49.0])    Surgeons: Stewart Caldera MD Responsible Provider: Kenneth Candelario MD    Anesthesia Type: general ASA Status: 2            Anesthesia Type: No value filed.    Renee Phase I:      Renee Phase II: Renee Score: 9    Anesthesia Post Evaluation    Patient location during evaluation: PACU  Patient participation: complete - patient participated  Level of consciousness: awake and alert  Airway patency: patent  Nausea & Vomiting: no nausea and no vomiting  Cardiovascular status: hemodynamically stable  Respiratory status: acceptable, nonlabored ventilation and spontaneous ventilation  Hydration status: euvolemic  Comments: BP (!) 126/56   Pulse 71   Temp 98.7 °F (37.1 °C)   Resp 21   Ht 1.6 m (5' 2.99\")   Wt 51.2 kg (112 lb 12.8 oz)   SpO2 99%   BMI 19.99 kg/m²     Multimodal analgesia pain management approach  Pain management: adequate and satisfactory to patient    No notable events documented.

## 2024-04-05 NOTE — ANESTHESIA PRE PROCEDURE
History     Tobacco Use   • Smoking status: Never   • Smokeless tobacco: Never   Substance Use Topics   • Alcohol use: No                                Counseling given: Not Answered      Vital Signs (Current):   Vitals:    03/29/24 1044 04/05/24 0858   BP:  (!) 150/76   Pulse:  84   Resp:  16   Temp:  97.8 °F (36.6 °C)   TempSrc:  Temporal   SpO2:  99%   Weight: 51.3 kg (113 lb) 51.2 kg (112 lb 12.8 oz)   Height: 1.6 m (5' 3\") 1.6 m (5' 2.99\")                                              BP Readings from Last 3 Encounters:   04/05/24 (!) 150/76   01/16/24 (!) 148/70   12/14/23 (!) 140/78       NPO Status: Time of last liquid consumption: 2300                        Time of last solid consumption: 2300                        Date of last liquid consumption: 04/04/24                        Date of last solid food consumption: 04/04/24    BMI:   Wt Readings from Last 3 Encounters:   04/05/24 51.2 kg (112 lb 12.8 oz)   01/16/24 52.2 kg (115 lb)   12/14/23 52.3 kg (115 lb 3.2 oz)     Body mass index is 19.99 kg/m².    CBC: No results found for: \"WBC\", \"RBC\", \"HGB\", \"HCT\", \"MCV\", \"RDW\", \"PLT\"    CMP: No results found for: \"NA\", \"K\", \"CL\", \"CO2\", \"BUN\", \"CREATININE\", \"GFRAA\", \"AGRATIO\", \"LABGLOM\", \"GLUCOSE\", \"GLU\", \"PROT\", \"CALCIUM\", \"BILITOT\", \"ALKPHOS\", \"AST\", \"ALT\"    POC Tests: No results for input(s): \"POCGLU\", \"POCNA\", \"POCK\", \"POCCL\", \"POCBUN\", \"POCHEMO\", \"POCHCT\" in the last 72 hours.    Coags: No results found for: \"PROTIME\", \"INR\", \"APTT\"    HCG (If Applicable): No results found for: \"PREGTESTUR\", \"PREGSERUM\", \"HCG\", \"HCGQUANT\"     ABGs: No results found for: \"PHART\", \"PO2ART\", \"OOE3WOD\", \"AFI0KKM\", \"BEART\", \"C9ASEXYX\"     Type & Screen (If Applicable):  No results found for: \"LABABO\", \"LABRH\"    Drug/Infectious Status (If Applicable):  No results found for: \"HIV\", \"HEPCAB\"    COVID-19 Screening (If Applicable): No results found for: \"COVID19\"        Anesthesia Evaluation  Patient summary reviewed  Airway:

## 2024-04-18 ENCOUNTER — OFFICE VISIT (OUTPATIENT)
Dept: ENT CLINIC | Age: 84
End: 2024-04-18
Payer: MEDICARE

## 2024-04-18 VITALS
WEIGHT: 110.4 LBS | HEIGHT: 63 IN | DIASTOLIC BLOOD PRESSURE: 64 MMHG | BODY MASS INDEX: 19.56 KG/M2 | SYSTOLIC BLOOD PRESSURE: 126 MMHG

## 2024-04-18 DIAGNOSIS — J38.00 VOCAL CORD PARESIS: Primary | ICD-10-CM

## 2024-04-18 DIAGNOSIS — R49.0 HOARSENESS: ICD-10-CM

## 2024-04-18 PROCEDURE — G8400 PT W/DXA NO RESULTS DOC: HCPCS | Performed by: STUDENT IN AN ORGANIZED HEALTH CARE EDUCATION/TRAINING PROGRAM

## 2024-04-18 PROCEDURE — 1090F PRES/ABSN URINE INCON ASSESS: CPT | Performed by: STUDENT IN AN ORGANIZED HEALTH CARE EDUCATION/TRAINING PROGRAM

## 2024-04-18 PROCEDURE — G8427 DOCREV CUR MEDS BY ELIG CLIN: HCPCS | Performed by: STUDENT IN AN ORGANIZED HEALTH CARE EDUCATION/TRAINING PROGRAM

## 2024-04-18 PROCEDURE — G8420 CALC BMI NORM PARAMETERS: HCPCS | Performed by: STUDENT IN AN ORGANIZED HEALTH CARE EDUCATION/TRAINING PROGRAM

## 2024-04-18 PROCEDURE — 1036F TOBACCO NON-USER: CPT | Performed by: STUDENT IN AN ORGANIZED HEALTH CARE EDUCATION/TRAINING PROGRAM

## 2024-04-18 PROCEDURE — 99213 OFFICE O/P EST LOW 20 MIN: CPT | Performed by: STUDENT IN AN ORGANIZED HEALTH CARE EDUCATION/TRAINING PROGRAM

## 2024-04-18 PROCEDURE — 1123F ACP DISCUSS/DSCN MKR DOCD: CPT | Performed by: STUDENT IN AN ORGANIZED HEALTH CARE EDUCATION/TRAINING PROGRAM

## 2024-04-18 RX ORDER — CLOBETASOL PROPIONATE 0.5 MG/G
CREAM TOPICAL
COMMUNITY
Start: 2024-04-02

## 2024-04-18 ASSESSMENT — ENCOUNTER SYMPTOMS
VOMITING: 0
EYE REDNESS: 0
VOICE CHANGE: 1
TROUBLE SWALLOWING: 0
SORE THROAT: 0
SHORTNESS OF BREATH: 0
EYE ITCHING: 0

## 2024-04-18 NOTE — PROGRESS NOTES
activity change and appetite change.   HENT:  Positive for voice change (hoarseness is present). Negative for congestion, ear discharge, ear pain, sore throat and trouble swallowing.    Eyes:  Negative for redness and itching.   Respiratory:  Negative for shortness of breath.    Cardiovascular:  Negative for chest pain.   Gastrointestinal:  Negative for vomiting.   Endocrine: Negative for cold intolerance and heat intolerance.   Allergic/Immunologic: Negative for environmental allergies.   Neurological:  Negative for light-headedness.   Psychiatric/Behavioral:  Negative for sleep disturbance.           PHYSICAL EXAM:    /64 (Site: Left Upper Arm, Position: Sitting)   Ht 1.598 m (5' 2.9\")   Wt 50.1 kg (110 lb 6.4 oz)   BMI 19.62 kg/m²     General: NAD, well-appearing  Neuro: No gross neuro deficits. CN's II-XII intact. No facial weakness.  Eyes: EOMI. Pupils reactive. No periorbital edema/ecchymosis.   Skin: No facial erythema, rashes or concerning lesions.  Nose: No external deviations or saddling. Intranasally, septum is midline without perforations, nasal mucosa appears healthy with no erythema, mucopurulence, or polyps.  Mouth: Moist mucus membranes, normal tongue/palate mobility, no concerning mucosal lesions.   Oropharynx: clear with no erythema/exudate, no tonsillar hypertrophy.  Abrasion to right lateral oropharynx healing appropriately.  Ears: Normal appearing auricles, no hematomas. EACs clear with no cerumen impaction, healthy canal skin, TM's intact with no perforations or retraction pockets. No middle ear effusions.  Neck: Soft, supple, no palpable lateral neck masses. No parotid or submandibular masses. No thyromegaly or palpable thyroid nodules. No surgical scars.  Lymphatics: No palpable cervical LAD.  Resp/Lungs: No audible stridor or wheezing, CTAB  Heart: RRR  Extremities: No clubbing or cyanosis.      ASSESSMENT and PLAN  I will see her back in 6 months.    ICD-10-CM    1. Vocal cord

## 2024-05-01 ENCOUNTER — OFFICE VISIT (OUTPATIENT)
Dept: ENT CLINIC | Age: 84
End: 2024-05-01
Payer: MEDICARE

## 2024-05-01 VITALS
DIASTOLIC BLOOD PRESSURE: 64 MMHG | WEIGHT: 112 LBS | SYSTOLIC BLOOD PRESSURE: 124 MMHG | HEIGHT: 63 IN | BODY MASS INDEX: 19.84 KG/M2

## 2024-05-01 DIAGNOSIS — J38.00 VOCAL CORD PARESIS: ICD-10-CM

## 2024-05-01 DIAGNOSIS — C01 MALIGNANT NEOPLASM OF BASE OF TONGUE (HCC): ICD-10-CM

## 2024-05-01 DIAGNOSIS — R49.0 HOARSENESS: ICD-10-CM

## 2024-05-01 DIAGNOSIS — R13.14 PHARYNGOESOPHAGEAL DYSPHAGIA: Primary | ICD-10-CM

## 2024-05-01 PROCEDURE — G8420 CALC BMI NORM PARAMETERS: HCPCS | Performed by: STUDENT IN AN ORGANIZED HEALTH CARE EDUCATION/TRAINING PROGRAM

## 2024-05-01 PROCEDURE — 31575 DIAGNOSTIC LARYNGOSCOPY: CPT | Performed by: STUDENT IN AN ORGANIZED HEALTH CARE EDUCATION/TRAINING PROGRAM

## 2024-05-01 PROCEDURE — 99213 OFFICE O/P EST LOW 20 MIN: CPT | Performed by: STUDENT IN AN ORGANIZED HEALTH CARE EDUCATION/TRAINING PROGRAM

## 2024-05-01 PROCEDURE — G8400 PT W/DXA NO RESULTS DOC: HCPCS | Performed by: STUDENT IN AN ORGANIZED HEALTH CARE EDUCATION/TRAINING PROGRAM

## 2024-05-01 PROCEDURE — 1036F TOBACCO NON-USER: CPT | Performed by: STUDENT IN AN ORGANIZED HEALTH CARE EDUCATION/TRAINING PROGRAM

## 2024-05-01 PROCEDURE — 1090F PRES/ABSN URINE INCON ASSESS: CPT | Performed by: STUDENT IN AN ORGANIZED HEALTH CARE EDUCATION/TRAINING PROGRAM

## 2024-05-01 PROCEDURE — 1123F ACP DISCUSS/DSCN MKR DOCD: CPT | Performed by: STUDENT IN AN ORGANIZED HEALTH CARE EDUCATION/TRAINING PROGRAM

## 2024-05-01 PROCEDURE — G8427 DOCREV CUR MEDS BY ELIG CLIN: HCPCS | Performed by: STUDENT IN AN ORGANIZED HEALTH CARE EDUCATION/TRAINING PROGRAM

## 2024-05-01 RX ORDER — SCOLOPAMINE TRANSDERMAL SYSTEM 1 MG/1
1 PATCH, EXTENDED RELEASE TRANSDERMAL
COMMUNITY
Start: 2024-04-25

## 2024-05-01 ASSESSMENT — ENCOUNTER SYMPTOMS
SHORTNESS OF BREATH: 0
TROUBLE SWALLOWING: 1
VOICE CHANGE: 1
EYE ITCHING: 0
VOMITING: 0
EYE REDNESS: 0

## 2024-05-01 NOTE — PROGRESS NOTES
[facility] Office Note    Patient: Gabriela Knight  MRN: [id number]  : 1940  Gender:  female  Vital Signs:   Voice qualityBP 124/64 (Site: Left Upper Arm, Position: Sitting)   Ht 1.598 m (5' 2.9\")   Wt 50.8 kg (112 lb)   BMI 19.90 kg/m²   Date: 2024    CC:   Chief Complaint   Patient presents with    Other     Patient presents today because she has been experiencing the feeling that food is stuck in her throat while she is eating and is still having hoarseness.        HPI:  Gabriela Knight is a 83 y.o. female s/p injection laryngoplasty and EGD with dilation on 2024.  She still has hoarseness.  She endorses dysphagia.  Voice quality will fluctuate.  She does not have a GI doctor.  Solid foods will occasionally get stuck in her throat.  She will often have to bring this up but it is usually just small particulate.  She was recently started on a scopolamine patch for oral incompetence.  She has seen SLP in the past and knows how to do voicing and swallowing exercises.    Past Medical History, Past Surgical History, Family history, Social History, and Medications were all reviewed with the patient today and updated as necessary.     No Known Allergies  Patient Active Problem List   Diagnosis    Urinary tract infection, site not specified    Hypothyroidism    Pure hypercholesterolemia    Calculus of kidney    Glaucoma    Dysphagia    Hoarseness    Vocal cord paresis    Larynx polyp    Swallowing difficulty    Dysphonia     Current Outpatient Medications   Medication Sig    scopolamine (TRANSDERM-SCOP) transdermal patch 1 patch    clobetasol (TEMOVATE) 0.05 % cream APPLY TO AFFECTED AREA 2 TO 3 TIMES DAILY    Calcium Carb-Cholecalciferol (CALCIUM 500 + D PO) Take 1 tablet by mouth daily    lisinopril (PRINIVIL;ZESTRIL) 5 MG tablet Take 1 tablet by mouth nightly    famotidine (PEPCID) 40 MG tablet Take 1 tablet by mouth every evening    atorvastatin (LIPITOR) 20 MG tablet Take 0.5 tablets

## 2024-05-16 ENCOUNTER — OFFICE VISIT (OUTPATIENT)
Dept: ENT CLINIC | Age: 84
End: 2024-05-16
Payer: MEDICARE

## 2024-05-16 VITALS
WEIGHT: 112 LBS | BODY MASS INDEX: 20.61 KG/M2 | HEIGHT: 62 IN | DIASTOLIC BLOOD PRESSURE: 60 MMHG | SYSTOLIC BLOOD PRESSURE: 122 MMHG

## 2024-05-16 DIAGNOSIS — R49.0 HOARSENESS: ICD-10-CM

## 2024-05-16 DIAGNOSIS — R63.30 FEEDING DIFFICULTIES, UNSPECIFIED: ICD-10-CM

## 2024-05-16 DIAGNOSIS — C01 MALIGNANT NEOPLASM OF BASE OF TONGUE (HCC): ICD-10-CM

## 2024-05-16 DIAGNOSIS — R13.14 PHARYNGOESOPHAGEAL DYSPHAGIA: Primary | ICD-10-CM

## 2024-05-16 DIAGNOSIS — J38.00 VOCAL CORD PARESIS: ICD-10-CM

## 2024-05-16 PROCEDURE — G8420 CALC BMI NORM PARAMETERS: HCPCS | Performed by: STUDENT IN AN ORGANIZED HEALTH CARE EDUCATION/TRAINING PROGRAM

## 2024-05-16 PROCEDURE — 1036F TOBACCO NON-USER: CPT | Performed by: STUDENT IN AN ORGANIZED HEALTH CARE EDUCATION/TRAINING PROGRAM

## 2024-05-16 PROCEDURE — 99213 OFFICE O/P EST LOW 20 MIN: CPT | Performed by: STUDENT IN AN ORGANIZED HEALTH CARE EDUCATION/TRAINING PROGRAM

## 2024-05-16 PROCEDURE — G8427 DOCREV CUR MEDS BY ELIG CLIN: HCPCS | Performed by: STUDENT IN AN ORGANIZED HEALTH CARE EDUCATION/TRAINING PROGRAM

## 2024-05-16 PROCEDURE — 1123F ACP DISCUSS/DSCN MKR DOCD: CPT | Performed by: STUDENT IN AN ORGANIZED HEALTH CARE EDUCATION/TRAINING PROGRAM

## 2024-05-16 PROCEDURE — 31575 DIAGNOSTIC LARYNGOSCOPY: CPT | Performed by: STUDENT IN AN ORGANIZED HEALTH CARE EDUCATION/TRAINING PROGRAM

## 2024-05-16 PROCEDURE — G8400 PT W/DXA NO RESULTS DOC: HCPCS | Performed by: STUDENT IN AN ORGANIZED HEALTH CARE EDUCATION/TRAINING PROGRAM

## 2024-05-16 PROCEDURE — 1090F PRES/ABSN URINE INCON ASSESS: CPT | Performed by: STUDENT IN AN ORGANIZED HEALTH CARE EDUCATION/TRAINING PROGRAM

## 2024-05-16 RX ORDER — PREDNISONE 10 MG/1
TABLET ORAL
COMMUNITY
Start: 2024-05-14

## 2024-05-16 RX ORDER — ESCITALOPRAM OXALATE 10 MG/1
TABLET ORAL
COMMUNITY
Start: 2024-05-14

## 2024-05-16 RX ORDER — SODIUM FLUORIDE 6.1 MG/ML
GEL, DENTIFRICE DENTAL
COMMUNITY
Start: 2024-04-25

## 2024-05-16 ASSESSMENT — ENCOUNTER SYMPTOMS
EYES NEGATIVE: 1
GASTROINTESTINAL NEGATIVE: 1
RESPIRATORY NEGATIVE: 1
ALLERGIC/IMMUNOLOGIC NEGATIVE: 1

## 2024-05-16 NOTE — PROGRESS NOTES
LAD.  Resp/Lungs: No audible stridor or wheezing, CTAB  Heart: RRR  Extremities: No clubbing or cyanosis.    PROCEDURE: Flexible laryngoscopy  INDICATIONS: Hoarseness  DESCRIPTION: Secondary to inadequate visualization of the larynx using a dental mirror flexible laryngoscopy was performed.  After verbal consent was obtained and a timeout was performed, the flexible scope was advanced down one of the patient's nares into the nasopharynx. The nasal cavity and nasopharynx were clear. The scope was then turned distally down towards the oropharynx. The BOT and vallecula were clear and the epiglottis was normal in appearance. Both aryepiglottic folds were clear.  Right true vocal cord immobility, left true vocal cord mobile.  Significant pooling of thick secretions in the right piriform.  She cannot clear the secretions with swallowing.  The posterior pharyngeal was clear as well. The scope was then carefully removed. The patient tolerated the procedure well and there were no complications.      ASSESSMENT and PLAN  She has upcoming appointment at [facility] in June.  I will give her rinse from the compounding pharmacy with doxycycline diphenhydramine hydrocortisone clotrimazole and lidocaine.  This will hopefully help her symptomatically.  I recommended increased hydration and nutrition.  I will also get a CT scan of her neck to further evaluate the nodularity in her right base of tongue.  I will get a modified barium swallow study for her.  I will see her back in 3 weeks.    ICD-10-CM    1. Pharyngoesophageal dysphagia  R13.14 FL MODIFIED BARIUM SWALLOW W VIDEO      2. 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 Hoarseness  R49.0 LARYNGOSCOPY,FLEX FIBER,DIAGNOSTIC      5. Feeding difficulties, unspecified  R63.30 FL MODIFIED BARIUM SWALLOW W VIDEO              Stewart Caldera MD  5/16/2024  Electronically signed    Note dictated using voice

## 2024-05-21 ENCOUNTER — HOSPITAL ENCOUNTER (OUTPATIENT)
Dept: GENERAL RADIOLOGY | Age: 84
Discharge: HOME | End: 2024-05-24
Attending: STUDENT IN AN ORGANIZED HEALTH CARE EDUCATION/TRAINING PROGRAM
Payer: MEDICARE

## 2024-05-21 DIAGNOSIS — R63.30 FEEDING DIFFICULTIES, UNSPECIFIED: ICD-10-CM

## 2024-05-21 DIAGNOSIS — R13.14 PHARYNGOESOPHAGEAL DYSPHAGIA: ICD-10-CM

## 2024-05-21 PROCEDURE — 92611 MOTION FLUOROSCOPY/SWALLOW: CPT

## 2024-05-21 PROCEDURE — 74230 X-RAY XM SWLNG FUNCJ C+: CPT

## 2024-05-21 PROCEDURE — 2500000003 HC RX 250 WO HCPCS: Performed by: STUDENT IN AN ORGANIZED HEALTH CARE EDUCATION/TRAINING PROGRAM

## 2024-05-21 RX ADMIN — BARIUM SULFATE 15 ML: 400 PASTE ORAL at 09:56

## 2024-05-21 RX ADMIN — BARIUM SULFATE 15 ML: 400 SUSPENSION ORAL at 09:56

## 2024-05-21 RX ADMIN — BARIUM SULFATE 30 ML: 0.81 POWDER, FOR SUSPENSION ORAL at 09:56

## 2024-05-21 NOTE — THERAPY EVALUATION
before the swallow  that was cleared spontaneously during the swallow. Residue was observed after the swallow at base of tongue, in valleculae, along posterior pharyngeal wall, and in pyriform sinuses that was mild. Residue was reduced with liquid wash.  Swallows of mixed consistencies were piecemealed and triggered at pyriform sinuses. No laryngeal penetration or aspiration was observed. Residue was observed after the swallow at base of tongue, in valleculae, along posterior pharyngeal wall, and in pyriform sinuses that was slight.Residue reduced with liquid wash. Aspirated liquid wash.   Pharyngeal characteristics:  delayed pharyngeal swallow initiation  reduced retraction of base of tongue  reduced hyolaryngeal elevation/excursion  absent epiglottic inversion  reduced constriction of posterior pharyngeal wall  reduced laryngeal closure  Aspiration/Penetration Scale:   Thin liquids: 8 (Silent aspiration. Contrast passes below the folds/glottis with no effort to eject.)  Mildly thick liquids: 8 (Silent aspiration. Contrast passes below the folds/glottis with no effort to eject.)  Moderately thick liquids: Not assessed.  Puddin (Deep penetration/visible residue. Contrast contacts the folds and is not ejected.)  Mixed consistency: 1 (No penetration/aspiration. Contrast does not enter the airway.)  Cracker: Not assessed.    Upper Esophageal Screen:   decreased opening of upper segment of esophagus  signs of reduced bolus clearance through cervical esophagus   *Distal esophagus not assessed due to limitations of modified barium swallow study.     National Outcomes Measure:   SWALLOWING: Level 5:  Swallowing is safe with minimal diet restriction and/or occasionally requires minimal cueing to use compensatory strategies. The individual may occasionally self-cue. All nutrition and hydration needs are met by mouth at mealtime.    PATIENT EDUCATION:  Education provided on the following topics: anatomy and physiology of

## 2024-05-29 ENCOUNTER — HOSPITAL ENCOUNTER (OUTPATIENT)
Dept: CT IMAGING | Age: 84
Discharge: HOME | End: 2024-06-01
Attending: STUDENT IN AN ORGANIZED HEALTH CARE EDUCATION/TRAINING PROGRAM
Payer: MEDICARE

## 2024-05-29 DIAGNOSIS — C01 MALIGNANT NEOPLASM OF BASE OF TONGUE (HCC): ICD-10-CM

## 2024-05-29 LAB — CREAT BLD-MCNC: 0.62 MG/DL (ref 0.8–1.5)

## 2024-05-29 PROCEDURE — 6360000004 HC RX CONTRAST MEDICATION: Performed by: STUDENT IN AN ORGANIZED HEALTH CARE EDUCATION/TRAINING PROGRAM

## 2024-05-29 PROCEDURE — 70491 CT SOFT TISSUE NECK W/DYE: CPT

## 2024-05-29 PROCEDURE — 82565 ASSAY OF CREATININE: CPT

## 2024-05-29 RX ADMIN — IOPAMIDOL 80 ML: 755 INJECTION, SOLUTION INTRAVENOUS at 14:08

## 2024-06-03 ENCOUNTER — TELEPHONE (OUTPATIENT)
Dept: ENT CLINIC | Age: 84
End: 2024-06-03

## 2024-06-03 RX ORDER — AMOXICILLIN 875 MG/1
875 TABLET, COATED ORAL 2 TIMES DAILY
Qty: 20 TABLET | Refills: 0 | Status: SHIPPED | OUTPATIENT
Start: 2024-06-03 | End: 2024-06-13

## 2024-06-03 NOTE — TELEPHONE ENCOUNTER
Patient walked into the [place] office stating that the pain she is experiencing has become unbearable and she would like to have an antibiotic sent in. She stated she did not want the medication Dr. Caldera had sent in to [place] [facility] on 5-16-24 ( oral mucositis* doxycycline 2% diphenhydramine 0.25%, hydrocortisone 0.025, clotrimazole 0.2%, lidocaine 2%.) because she had tried the previous medication from them  and it did not help it actually caused her more pain. I explained that the one sent on 5-16-24 is a different compounded medication and gave her a copy of the rx so she could look into it as well. She is using hurricane spray and has magic mouthwash she got from her dentist to manage her pain the best she can. She requested we send in amoxicillin stating she believes there is an infection and Olya Schwarz aggred to do this for the patient. She will let us know if this doesn't help and she agreed to look into the medication Dr. Caldera recommended further.

## 2024-06-04 ENCOUNTER — TELEPHONE (OUTPATIENT)
Dept: ENT CLINIC | Age: 84
End: 2024-06-04

## 2024-06-04 NOTE — TELEPHONE ENCOUNTER
Called pt to let her know that per  her ct was normal. Pt voiced understanding and will finish antibiotics.

## 2024-06-18 ENCOUNTER — OFFICE VISIT (OUTPATIENT)
Dept: ENT CLINIC | Age: 84
End: 2024-06-18
Payer: MEDICARE

## 2024-06-18 VITALS
SYSTOLIC BLOOD PRESSURE: 120 MMHG | WEIGHT: 112 LBS | BODY MASS INDEX: 20.61 KG/M2 | HEIGHT: 62 IN | DIASTOLIC BLOOD PRESSURE: 62 MMHG

## 2024-06-18 DIAGNOSIS — J38.00 VOCAL CORD PARESIS: ICD-10-CM

## 2024-06-18 DIAGNOSIS — R13.14 PHARYNGOESOPHAGEAL DYSPHAGIA: Primary | ICD-10-CM

## 2024-06-18 DIAGNOSIS — C01 MALIGNANT NEOPLASM OF BASE OF TONGUE (HCC): ICD-10-CM

## 2024-06-18 DIAGNOSIS — K13.79 ORAL PAIN: ICD-10-CM

## 2024-06-18 DIAGNOSIS — R49.0 HOARSENESS: ICD-10-CM

## 2024-06-18 PROCEDURE — 1036F TOBACCO NON-USER: CPT | Performed by: STUDENT IN AN ORGANIZED HEALTH CARE EDUCATION/TRAINING PROGRAM

## 2024-06-18 PROCEDURE — 31575 DIAGNOSTIC LARYNGOSCOPY: CPT | Performed by: STUDENT IN AN ORGANIZED HEALTH CARE EDUCATION/TRAINING PROGRAM

## 2024-06-18 PROCEDURE — 96372 THER/PROPH/DIAG INJ SC/IM: CPT | Performed by: STUDENT IN AN ORGANIZED HEALTH CARE EDUCATION/TRAINING PROGRAM

## 2024-06-18 PROCEDURE — 1090F PRES/ABSN URINE INCON ASSESS: CPT | Performed by: STUDENT IN AN ORGANIZED HEALTH CARE EDUCATION/TRAINING PROGRAM

## 2024-06-18 PROCEDURE — G8400 PT W/DXA NO RESULTS DOC: HCPCS | Performed by: STUDENT IN AN ORGANIZED HEALTH CARE EDUCATION/TRAINING PROGRAM

## 2024-06-18 PROCEDURE — 99214 OFFICE O/P EST MOD 30 MIN: CPT | Performed by: STUDENT IN AN ORGANIZED HEALTH CARE EDUCATION/TRAINING PROGRAM

## 2024-06-18 PROCEDURE — G8427 DOCREV CUR MEDS BY ELIG CLIN: HCPCS | Performed by: STUDENT IN AN ORGANIZED HEALTH CARE EDUCATION/TRAINING PROGRAM

## 2024-06-18 PROCEDURE — G8420 CALC BMI NORM PARAMETERS: HCPCS | Performed by: STUDENT IN AN ORGANIZED HEALTH CARE EDUCATION/TRAINING PROGRAM

## 2024-06-18 PROCEDURE — 1123F ACP DISCUSS/DSCN MKR DOCD: CPT | Performed by: STUDENT IN AN ORGANIZED HEALTH CARE EDUCATION/TRAINING PROGRAM

## 2024-06-18 RX ORDER — CLOTRIMAZOLE 10 MG/1
LOZENGE ORAL; TOPICAL
COMMUNITY
Start: 2024-06-12

## 2024-06-18 RX ADMIN — TRIAMCINOLONE ACETONIDE 40 MG: 40 INJECTION, SUSPENSION INTRA-ARTICULAR; INTRAMUSCULAR at 03:00

## 2024-06-18 ASSESSMENT — ENCOUNTER SYMPTOMS
ALLERGIC/IMMUNOLOGIC NEGATIVE: 1
RESPIRATORY NEGATIVE: 1
CHEST TIGHTNESS: 0
GASTROINTESTINAL NEGATIVE: 1
COLOR CHANGE: 0
EYE DISCHARGE: 0
VOICE CHANGE: 1
BACK PAIN: 0
EYES NEGATIVE: 1
VOMITING: 0

## 2024-06-18 NOTE — PROGRESS NOTES
Calculus of kidney    Glaucoma    Dysphagia    Hoarseness    Vocal cord paresis    Larynx polyp    Swallowing difficulty    Dysphonia     Current Outpatient Medications   Medication Sig    clotrimazole (MYCELEX) 10 MG gayle DISSOVLE 1 TABLET BY MOUTH 5 TIMES PER DAY    escitalopram (LEXAPRO) 10 MG tablet 1/2 po qd    predniSONE (DELTASONE) 10 MG tablet 2 p.o. daily x 2 weeks then 1 p.o. daily    PREVIDENT 5000 DRY MOUTH 1.1 % GEL USE WHEN BRUSHING TEETH AS NEEDED    scopolamine (TRANSDERM-SCOP) transdermal patch 1 patch    clobetasol (TEMOVATE) 0.05 % cream APPLY TO AFFECTED AREA 2 TO 3 TIMES DAILY    Calcium Carb-Cholecalciferol (CALCIUM 500 + D PO) Take 1 tablet by mouth daily    lisinopril (PRINIVIL;ZESTRIL) 5 MG tablet Take 1 tablet by mouth nightly    famotidine (PEPCID) 40 MG tablet Take 1 tablet by mouth every evening    atorvastatin (LIPITOR) 20 MG tablet Take 0.5 tablets by mouth nightly    PREVIDENT 5000 BOOSTER PLUS 1.1 % PSTE USE WHEN BRUSHING TEETH AS NEEDED    nitrofurantoin (MACRODANTIN) 50 MG capsule TAKE 1 CAPSULE BY MOUTH EVERY DAY    vitamin D 25 MCG (1000 UT) CAPS Take by mouth 2 times daily    levothyroxine (SYNTHROID) 88 MCG tablet Take 1 tablet by mouth every morning (before breakfast)     No current facility-administered medications for this visit.     Past Medical History:   Diagnosis Date    Arthritis     Calculus of kidney     Calculus of kidney     Cancer (HCC) Oct 2013    T[facility] at [facility]    Chronic pharyngitis     Dry mouth     Epistaxis     GERD (gastroesophageal reflux disease)     omeprazole    Glaucoma     Hoarseness     Hypertension     Hypothyroidism     Mixed conductive and sensorineural hearing loss     Pure hypercholesterolemia     Squamous cell cancer of tongue (HCC)     Thyroid disease     hypothyroid    Tongue lesion     Urinary tract infection, site not specified      Social History     Tobacco Use    Smoking status: Never    Smokeless tobacco: Never

## 2024-06-19 RX ORDER — TRIAMCINOLONE ACETONIDE 40 MG/ML
40 INJECTION, SUSPENSION INTRA-ARTICULAR; INTRAMUSCULAR ONCE
Status: COMPLETED | OUTPATIENT
Start: 2024-06-19 | End: 2024-06-18

## 2024-08-14 ENCOUNTER — OFFICE VISIT (OUTPATIENT)
Dept: ENT CLINIC | Age: 84
End: 2024-08-14
Payer: MEDICARE

## 2024-08-14 VITALS — HEIGHT: 62 IN | RESPIRATION RATE: 16 BRPM | WEIGHT: 112 LBS | BODY MASS INDEX: 20.61 KG/M2

## 2024-08-14 DIAGNOSIS — C01 MALIGNANT NEOPLASM OF BASE OF TONGUE (HCC): Primary | ICD-10-CM

## 2024-08-14 DIAGNOSIS — R13.14 PHARYNGOESOPHAGEAL DYSPHAGIA: Primary | ICD-10-CM

## 2024-08-14 DIAGNOSIS — J38.00 VOCAL CORD PARESIS: ICD-10-CM

## 2024-08-14 DIAGNOSIS — C01 MALIGNANT NEOPLASM OF BASE OF TONGUE (HCC): ICD-10-CM

## 2024-08-14 PROCEDURE — 1036F TOBACCO NON-USER: CPT | Performed by: STUDENT IN AN ORGANIZED HEALTH CARE EDUCATION/TRAINING PROGRAM

## 2024-08-14 PROCEDURE — G8420 CALC BMI NORM PARAMETERS: HCPCS | Performed by: STUDENT IN AN ORGANIZED HEALTH CARE EDUCATION/TRAINING PROGRAM

## 2024-08-14 PROCEDURE — G8400 PT W/DXA NO RESULTS DOC: HCPCS | Performed by: STUDENT IN AN ORGANIZED HEALTH CARE EDUCATION/TRAINING PROGRAM

## 2024-08-14 PROCEDURE — 31575 DIAGNOSTIC LARYNGOSCOPY: CPT | Performed by: STUDENT IN AN ORGANIZED HEALTH CARE EDUCATION/TRAINING PROGRAM

## 2024-08-14 PROCEDURE — 99214 OFFICE O/P EST MOD 30 MIN: CPT | Performed by: STUDENT IN AN ORGANIZED HEALTH CARE EDUCATION/TRAINING PROGRAM

## 2024-08-14 PROCEDURE — 1123F ACP DISCUSS/DSCN MKR DOCD: CPT | Performed by: STUDENT IN AN ORGANIZED HEALTH CARE EDUCATION/TRAINING PROGRAM

## 2024-08-14 PROCEDURE — 1090F PRES/ABSN URINE INCON ASSESS: CPT | Performed by: STUDENT IN AN ORGANIZED HEALTH CARE EDUCATION/TRAINING PROGRAM

## 2024-08-14 PROCEDURE — G8427 DOCREV CUR MEDS BY ELIG CLIN: HCPCS | Performed by: STUDENT IN AN ORGANIZED HEALTH CARE EDUCATION/TRAINING PROGRAM

## 2024-08-14 RX ORDER — PENTOXIFYLLINE 400 MG/1
TABLET, EXTENDED RELEASE ORAL
COMMUNITY
Start: 2024-06-25

## 2024-08-14 RX ORDER — GABAPENTIN 100 MG/1
100 CAPSULE ORAL 3 TIMES DAILY
Qty: 90 CAPSULE | Refills: 5 | Status: SHIPPED | OUTPATIENT
Start: 2024-08-14 | End: 2025-02-10

## 2024-08-14 RX ORDER — PENTOXIFYLLINE 400 MG/1
400 TABLET, EXTENDED RELEASE ORAL
COMMUNITY
Start: 2024-06-25 | End: 2024-08-24

## 2024-08-14 ASSESSMENT — ENCOUNTER SYMPTOMS
SINUS PAIN: 0
NAUSEA: 0
COUGH: 0
EYE PAIN: 0
STRIDOR: 0
DIARRHEA: 0
EYE ITCHING: 0
WHEEZING: 0
CHOKING: 0
VOICE CHANGE: 1
FACIAL SWELLING: 0
SINUS PRESSURE: 0
CONSTIPATION: 0
APNEA: 0
EYE DISCHARGE: 0
SHORTNESS OF BREATH: 0

## 2024-08-14 NOTE — PROGRESS NOTES
Both aryepiglottic folds were clear and there was a normal appearing glottis w/ healthy TVCs.  Right true vocal cord immobility.  No concerning lesions seen along post-cricoid region or within either piriform sinus. The posterior pharyngeal was clear as well. The scope was then carefully removed. The patient tolerated the procedure well and there were no complications.     CT Result (most recent):  CT SOFT TISSUE NECK W CONTRAST 05/29/2024    Narrative  CT OF THE NECK    INDICATION: History of neoplasm of the tongue base. New lesion on the tongue  post esophageal stretching.    TECHNIQUE: Multiple 2D axial images were obtained through the neck.  80mL of  Isovue 370 intravenous contrast was used for better evaluation of solid organs  and vascular structures.  Radiation dose reduction techniques were used for this  study.  All CT scans performed at this facility use one or all of the following:  Automated exposure control, adjustment of the mA and/or kVp according to  patient's size, iterative reconstruction.    COMPARISON: 4/11/2011.    FINDINGS:  - CERVICAL NODES: No significant adenopathy.  - SALIVARY GLANDS: No masses.  - TONSILS/PHARYNX: The base of the right tongue appears to have been resected.  No new enhancing lesions are seen. Calcifications are again noted surrounding  the hyoid.  - THYROID: No significant mass.    - SINUSES: Clear.  - BONES: No bone lesions.  - LUNG APICES: Clear.  - OTHER: The lung apices are clear.    Impression  No acute findings.  ntains abnormal data Basic Metabolic Panel  Order: [id number]  Component  Ref Range & Units 7/1/24 1056   Sodium  135 - 146 mmol/L 133 Low    Potassium  3.5 - 5.5 mmol/L 4.5   Chloride  92 - 114 mmol/L 96   CO2  21 - 32 mmol/L 31   BUN  7 - 25 mg/dL 19   Creatinine  0.44 - 1.00 mg/dL 0.73   Glucose  64 - 100 mg/dL 114 High    Calcium  8.3 - 10.7 mg/dL 9.4   eGFR  >59 mL/min/1.73 m2 82     ntains abnormal data Sedimentation Rate  Order:

## (undated) DEVICE — GLOVE ORANGE PI 7 1/2   MSG9075

## (undated) DEVICE — KIT PROCEDURE SURG T AND A ORAL TOTE

## (undated) DEVICE — DRAPE TWL SURG 16X26IN BLU ORB04] ALLCARE INC]

## (undated) DEVICE — SOLUTION IRRIG 1000ML STRL H2O USP PLAS POUR BTL

## (undated) DEVICE — KIT,ANTI FOG,W/SPONGE & FLUID,SOFT PACK: Brand: MEDLINE

## (undated) DEVICE — GUARD TEETH AD NYL FOR LARYNSCP POS PROTCT

## (undated) DEVICE — JELLY LUBRICATING 10GM PREFIL SYR LUBE

## (undated) DEVICE — PAD,NON-ADHERENT,3X8,STERILE,LF,1/PK: Brand: MEDLINE

## (undated) DEVICE — SOLUTION IRRIG 1000ML 0.9% SOD CHL USP POUR PLAS BTL